# Patient Record
Sex: FEMALE | Race: WHITE | NOT HISPANIC OR LATINO | Employment: FULL TIME | URBAN - METROPOLITAN AREA
[De-identification: names, ages, dates, MRNs, and addresses within clinical notes are randomized per-mention and may not be internally consistent; named-entity substitution may affect disease eponyms.]

---

## 2023-12-28 ENCOUNTER — OFFICE VISIT (OUTPATIENT)
Dept: OBSTETRICS AND GYNECOLOGY | Facility: CLINIC | Age: 53
End: 2023-12-28
Payer: COMMERCIAL

## 2023-12-28 VITALS
BODY MASS INDEX: 29.86 KG/M2 | WEIGHT: 148.1 LBS | DIASTOLIC BLOOD PRESSURE: 79 MMHG | SYSTOLIC BLOOD PRESSURE: 123 MMHG | HEIGHT: 59 IN

## 2023-12-28 DIAGNOSIS — N95.1 VAGINAL DRYNESS, MENOPAUSAL: ICD-10-CM

## 2023-12-28 DIAGNOSIS — Z01.419 ENCOUNTER FOR ANNUAL ROUTINE GYNECOLOGICAL EXAMINATION: Primary | ICD-10-CM

## 2023-12-28 PROCEDURE — 99396 PREV VISIT EST AGE 40-64: CPT | Performed by: OBSTETRICS & GYNECOLOGY

## 2023-12-28 PROCEDURE — 1036F TOBACCO NON-USER: CPT | Performed by: OBSTETRICS & GYNECOLOGY

## 2023-12-28 RX ORDER — ESTRADIOL 10 UG/1
10 INSERT VAGINAL 2 TIMES WEEKLY
Qty: 24 EACH | Refills: 3 | Status: SHIPPED | OUTPATIENT
Start: 2023-12-28 | End: 2024-01-24

## 2023-12-28 NOTE — PROGRESS NOTES
Subjective   Patient ID: Ambika Li is a 53 y.o. female who presents for Annual Exam (Patient here for annual- no c/o/Hrt= none/Declined chaperone).  HPI  Patient is a 53-year-old  1 para 1 white female whose had 1  section.  Patient has been menopausal for approximately 3 years.  She does not use hormone therapy denies any vaginal bleeding.  Does complain of significant vaginal dryness.  She admits to regular bowel movements is up-to-date on her colonoscopy denies any urinary symptoms.  Last normal Pap smear 2021 last normal mammogram 2023.  She does follow-up with breast specialist for breast pain.  Will repeat mammogram in 2024.  Review of Systems  Abdomen: No abdominal pain, nausea, vomiting, diarrhea, or constipation. No bloating, early satiety, indigestion, or increased flatulence.  Bladder: No dysuria, gross hematuria, urinary frequency, urinary urgency, or incontinence.  Breast: No breast lumps, nipple discharge, overlying skin changes, redness or skin retraction.  Objective   Physical Exam  Gen.: Alert and in no acute distress. Well-developed, well-nourished.  Thyroid: Nonenlarged and no palpable thyroid nodules  Cardiovascular: Heart regular rate and rhythm  Pulmonary: Clear bilateral breath sounds  Breasts: Normal appearance, no nipple discharge and no skin changes. No palpable masses and no axillary lymphadenopathy  Abdomen: Soft and nontender, no abdominal mass palpated, no organomegaly   Pelvic: External genitalia normal, Bartholin's urethral and Moraga's glands normal. Vagina normal. Cervix normal. Uterus normal size and shape. Right adnexa normal without masses. Left adnexa normal without masses. Perianal area and normal.  Assessment/Plan   Annual GYN exam, Pap and HPV not needed this year, mammogram 2024, risk benefits and alternatives to Imvexxy discussed with patient and she agreed.  Will electronically sent to pharmacy on file.  Will follow-up in 1 year or  as needed.         Tato Henriquez MD 12/28/23 1:58 PM

## 2023-12-29 ENCOUNTER — APPOINTMENT (OUTPATIENT)
Dept: OBSTETRICS AND GYNECOLOGY | Facility: CLINIC | Age: 53
End: 2023-12-29
Payer: COMMERCIAL

## 2024-01-24 RX ORDER — ESTRADIOL 0.1 MG/G
1 CREAM VAGINAL 2 TIMES WEEKLY
Qty: 42.5 G | Refills: 3 | Status: SHIPPED | OUTPATIENT
Start: 2024-01-25 | End: 2025-01-24

## 2024-03-03 NOTE — PROGRESS NOTES
"History Of Present Illness :  Ambika Li is a 53 y.o. female who presents for 1 year breast health follow-up.  The patient was seen by me for initial office consultation on 3/15/2023.  Please see the note(s) in legacy  [employer] for details.    About 2 months after her visit a year ago, the patient's breast pain/discomfort completely resolved.  She currently has no symptoms or throughout the breasts such as mass, pain, nipple discharge, or skin or nipple changes.    The patient underwent bilateral digital screening mammography today with tomosynthesis.  I personally reviewed the report and the images.  The breast tissue is fibroglandular to heterogeneously dense.  There are a few benign-appearing calcifications bilaterally.  There are no suspicious densities or microcalcifications bilaterally.     3/15/2023:  This is a 52-year-old white female patient of Dr. Anne Lombardo, referred to the [facility] breast clinic by Dr. Tato Henriquez, for further evaluation of right breast pain. The patient was seen by Dr. Henriquez recently on 2/24/2023 that note was reviewed. At that time, the patient complained right breast pain laterally and superiorly near the axillary tail.     To me, the patient notes a 1 month history of a \"sensation\" in the axillary tail of the right breast. She describes this primarily as burning. This is relatively constant but does wax and wane. Nothing is associated with the exacerbation such as activity or time of day. This is random. She is taking no medications for this. She is never had similar symptoms in the past. She denies any and all other symptoms with regard to the breast such as other pain, mass, lump, nipple discharge, or skin or nipple changes. The patient does not smoke or use excessive caffeine.      All of the patient's recent breast imaging was reviewed. On 4/28/2022 the patient underwent bilateral digital screening mammogram with tomosynthesis at [facility]. I do not have the " images to review. Per report, the patient had some possible asymmetry in the right breast on the MLO view only, superiorly and posteriorly. Additional imaging was recommended. I do not have those reports. No other significant masses calcifications or other findings were noted.     More recently, the patient underwent bilateral digital diagnostic mammography with tomosynthesis on 3/1/2023 at [facility]. I personally reviewed the report and the images. She also had sonography of the right breast. With regard to mammography, the patient has scattered fibroglandular breast tissue. A marker was placed at the axillary tail of the patient's site of pain. No underlying abnormality was noted. There are no suspicious densities or calcifications bilaterally. Ultrasound of the right breast and axilla revealed 2 normal-appearing lymph nodes with no other sonographic abnormalities.     Breast cancer risk assessment includes age of 52, age of menarche of 12, and age of first live birth is 37. She is  para 1 abortus 0. The delivery was a . She has no first-degree relatives with breast cancer. She has a maternal first cousin had breast cancer. She has never had a breast biopsy or breast issue in the past.     Based on the Breast Cancer Risk Assessment Tool from the National [employer] [facility] (NCI), the patient's breast cancer risk is as follows:     Patient's 5-year risk of breast cancer development is 1.5% compared to average of 1.4% for age     Patient's lifetime risk of breast cancer development is 11.8% compared to 10.4% average for age     Patient is single. She lives in [place]. 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    Past Medical History   Past Medical History:   Diagnosis Date    Personal history of other diseases of the musculoskeletal system and connective tissue     History of spinal stenosis        Surgical History    Past Surgical History:   Procedure Laterality Date      SECTION, CLASSIC  2015     Section    OTHER SURGICAL HISTORY  2021    Carpal tunnel surgery        Allergies   No Known Allergies     Home Meds  Current Outpatient Medications   Medication Instructions    estradiol (ESTRACE) 1 g, vaginal, 2 times weekly        Family History    No family history on file.     Social History  Social History     Tobacco Use    Smoking status: Never    Smokeless tobacco: Never        Review Of Systems    Review of Systems     General: Not Present- Obesity, Cancer, HIV, MRSA, Recent Cold/Flu, Tired During the Day and VRE.  HEENT: Not Present- Migraine, Cataracts, Glaucoma, Macular Degeneration and Retinal Detachment.  Respiratory: Not Present- Asthma, Chronic Cough, Difficulty Breathing on Exertion, Difficulty Breathing at Rest, Emphysema, Frequent Bronchitis, Home CPAP/BiPAP, Home Oxygen, Pulmonary Embolus, Pneumonia/TB, Sleep Apnea and Snoring.  Cardiovascular: Not Present- Chest Pain, Congestive Heart Failure, Heart Attack, Coronary Artery Disease, Heart Stent, High Cholesterol/Lipids, Hypertension, Internal Defibrillator, Irregular Heart Beat, Mitral Valve Prolapse, Murmur, Pacemaker and Peripheral Vascular Disease.  Gastrointestinal: Not Present- Heartburn, Hepatitis, Hiatal Hernia, Jaundice, Reflux, Stomach Ulcer and IBS.  Female Genitourinary: Not Present- Kidney Failure, Kidney Stones, Dialysis and Urinary Tract Infection.  Musculoskeletal: Not Present- Arthritis, Back Pain and Fibromyalgia.  Neurological: Not Present- Headaches, Numbness, Tingling, Seizures, Stroke,  Shunt and Weakness.  Psychiatric: Not Present- Anxiety, Bipolar, Depression and Panic Attacks.  Endocrine: Not Present- Diabetes, Hyperthyroidism, Hypothyroidism and Low Blood Sugar.  Hematology: Not Present- Abnormal Bleeding, Anemia and Blood Clots.     Vitals  There were no vitals taken for this visit.     Physical Exam   Breast Physical Exam     General  Mental Status - Alert. General  Appearance - Not Anxious, in acute distress or Sickly. Orientation - Oriented X3. Build  & Nutrition - Well nourished. Posture - Normal posture. Gait - Normal. Hydration - Well hydrated. Voice - Normal.     Integumentary  Global Assessment: Upon inspection and palpation of skin surfaces of the - Head/Face: no rashes, ulcers, lesions or evidence of photo damage. No palpable nodules or masses, Neck: no visible lesions or palpable masses, Chest: no  swelling,scarring or lesions. No prominent arteries or veins observed and Abdomen: no scars, rashes or lesions.     Breast  Nipples: Characteristics - Bilateral - Normal. Discharge - Bilateral - None.  Breast - Bilateral - Non Tender. No Bulging, Dimpling, Inflammation or Scarring. Large. Slightly pendulous. Symmetric.   Breast Lump: - No Palpable Breast Mass.     Lymphatic  Head & Neck  General Head & Neck Lymphatics:  Bilateral: Description - No Localized lymphadenopathy. Overlying skin - Normal.  Axillary  General Axillary Region:  Bilateral: Description - No Localized lymphadenopathy. Tenderness - Non Tender.     Assessment/Plan     Mrs. Li's clinical and mammographic follow-up remains satisfactory.    The right mastalgia has resolved.         Recommendation:     Continue monthly self breast exams    The patient will return to Dr. Henriquez for yearly breast exams and screening mammography    Follow-up with me as needed

## 2024-03-06 ENCOUNTER — HOSPITAL ENCOUNTER (OUTPATIENT)
Dept: RADIOLOGY | Facility: CLINIC | Age: 54
Discharge: HOME | End: 2024-03-06
Payer: COMMERCIAL

## 2024-03-06 ENCOUNTER — OFFICE VISIT (OUTPATIENT)
Dept: SURGERY | Facility: CLINIC | Age: 54
End: 2024-03-06
Payer: COMMERCIAL

## 2024-03-06 VITALS
TEMPERATURE: 97.8 F | HEIGHT: 59 IN | DIASTOLIC BLOOD PRESSURE: 81 MMHG | WEIGHT: 145 LBS | RESPIRATION RATE: 18 BRPM | SYSTOLIC BLOOD PRESSURE: 123 MMHG | BODY MASS INDEX: 29.23 KG/M2 | HEART RATE: 65 BPM | OXYGEN SATURATION: 97 %

## 2024-03-06 DIAGNOSIS — N64.4 MASTODYNIA: ICD-10-CM

## 2024-03-06 DIAGNOSIS — Z12.31 ENCOUNTER FOR SCREENING MAMMOGRAM FOR MALIGNANT NEOPLASM OF BREAST: Primary | ICD-10-CM

## 2024-03-06 PROCEDURE — 77067 SCR MAMMO BI INCL CAD: CPT | Mod: BILATERAL PROCEDURE | Performed by: RADIOLOGY

## 2024-03-06 PROCEDURE — 77063 BREAST TOMOSYNTHESIS BI: CPT | Mod: BILATERAL PROCEDURE | Performed by: RADIOLOGY

## 2024-03-06 PROCEDURE — 77063 BREAST TOMOSYNTHESIS BI: CPT

## 2024-03-06 PROCEDURE — 99213 OFFICE O/P EST LOW 20 MIN: CPT | Performed by: SURGERY

## 2024-03-06 PROCEDURE — 1036F TOBACCO NON-USER: CPT | Performed by: SURGERY

## 2024-03-06 SDOH — ECONOMIC STABILITY: FOOD INSECURITY: WITHIN THE PAST 12 MONTHS, THE FOOD YOU BOUGHT JUST DIDN'T LAST AND YOU DIDN'T HAVE MONEY TO GET MORE.: NEVER TRUE

## 2024-03-06 SDOH — ECONOMIC STABILITY: FOOD INSECURITY: WITHIN THE PAST 12 MONTHS, YOU WORRIED THAT YOUR FOOD WOULD RUN OUT BEFORE YOU GOT MONEY TO BUY MORE.: NEVER TRUE

## 2024-03-06 ASSESSMENT — COLUMBIA-SUICIDE SEVERITY RATING SCALE - C-SSRS
2. HAVE YOU ACTUALLY HAD ANY THOUGHTS OF KILLING YOURSELF?: NO
1. IN THE PAST MONTH, HAVE YOU WISHED YOU WERE DEAD OR WISHED YOU COULD GO TO SLEEP AND NOT WAKE UP?: NO
6. HAVE YOU EVER DONE ANYTHING, STARTED TO DO ANYTHING, OR PREPARED TO DO ANYTHING TO END YOUR LIFE?: NO

## 2024-03-06 ASSESSMENT — LIFESTYLE VARIABLES
SKIP TO QUESTIONS 9-10: 1
HOW MANY STANDARD DRINKS CONTAINING ALCOHOL DO YOU HAVE ON A TYPICAL DAY: 1 OR 2
HOW OFTEN DO YOU HAVE A DRINK CONTAINING ALCOHOL: 2-4 TIMES A MONTH
AUDIT-C TOTAL SCORE: 2
HOW OFTEN DO YOU HAVE SIX OR MORE DRINKS ON ONE OCCASION: NEVER

## 2024-03-06 ASSESSMENT — ENCOUNTER SYMPTOMS
OCCASIONAL FEELINGS OF UNSTEADINESS: 0
DEPRESSION: 0
LOSS OF SENSATION IN FEET: 0

## 2024-03-06 ASSESSMENT — PATIENT HEALTH QUESTIONNAIRE - PHQ9
SUM OF ALL RESPONSES TO PHQ9 QUESTIONS 1 & 2: 0
1. LITTLE INTEREST OR PLEASURE IN DOING THINGS: NOT AT ALL
2. FEELING DOWN, DEPRESSED OR HOPELESS: NOT AT ALL

## 2024-03-06 ASSESSMENT — PAIN SCALES - GENERAL: PAINLEVEL: 0-NO PAIN

## 2024-05-23 ENCOUNTER — TELEPHONE (OUTPATIENT)
Dept: OBSTETRICS AND GYNECOLOGY | Facility: CLINIC | Age: 54
End: 2024-05-23
Payer: COMMERCIAL

## 2024-05-23 DIAGNOSIS — N95.0 PMB (POSTMENOPAUSAL BLEEDING): Primary | ICD-10-CM

## 2024-05-23 NOTE — TELEPHONE ENCOUNTER
Poke to patient on the phone.  Had weeklong episode of period like bleeding.  Has not bled in about 3 years.  Currently does not use any hormone therapy.  Recommend pelvic ultrasound and proceed accordingly.    ----- Message from Florence Sung sent at 5/22/2024 12:39 PM EDT -----  Regarding: FW: Period  Contact: [phone]  Hi Dr. Henriquez ,  Please advise.  Thank you   GG  ----- Message -----  From: Ambika Li  Sent: 5/22/2024   9:58 AM EDT  [facility]  [id number]  Subject: Period                                           Hi Dr. Henriquez  I was thinking I was post-menopausal after almost 3 years of not mensurating. Surprise, surprise, I bled like a regular period for 8 days. Should I be concerned? It seemed normal as I remember my periods would have been. Do I restart the clock? Should I come in to be checked as a precaution? Would this recent period justify have a pap to verify no issue? Let me know your opinion. Ambika [phone]

## 2024-05-29 ENCOUNTER — HOSPITAL ENCOUNTER (OUTPATIENT)
Dept: RADIOLOGY | Facility: CLINIC | Age: 54
Discharge: HOME | End: 2024-05-29
Payer: COMMERCIAL

## 2024-05-29 DIAGNOSIS — N95.0 PMB (POSTMENOPAUSAL BLEEDING): ICD-10-CM

## 2024-05-29 PROCEDURE — 76830 TRANSVAGINAL US NON-OB: CPT | Performed by: RADIOLOGY

## 2024-05-29 PROCEDURE — 76857 US EXAM PELVIC LIMITED: CPT | Performed by: RADIOLOGY

## 2024-05-29 PROCEDURE — 76856 US EXAM PELVIC COMPLETE: CPT

## 2024-08-02 ENCOUNTER — APPOINTMENT (OUTPATIENT)
Dept: OBSTETRICS AND GYNECOLOGY | Facility: CLINIC | Age: 54
End: 2024-08-02
Payer: COMMERCIAL

## 2024-08-02 VITALS
WEIGHT: 144.7 LBS | HEIGHT: 59 IN | SYSTOLIC BLOOD PRESSURE: 130 MMHG | BODY MASS INDEX: 29.17 KG/M2 | DIASTOLIC BLOOD PRESSURE: 84 MMHG

## 2024-08-02 DIAGNOSIS — N95.0 PMB (POSTMENOPAUSAL BLEEDING): Primary | ICD-10-CM

## 2024-08-02 PROCEDURE — 58100 BIOPSY OF UTERUS LINING: CPT | Performed by: OBSTETRICS & GYNECOLOGY

## 2024-08-02 NOTE — PROGRESS NOTES
Patient ID: Ambika Li is a 54 y.o. female.    Endometrial biopsy    Date/Time: 8/2/2024 9:38 AM    Performed by: Tato Henriquez MD  Authorized by: Tato Henriquez MD    Consent:     Consent obtained: written    Consent given by: patient    Risks discussed: bleeding, infection and pain    Alternatives discussed: alternative treatment    Patient agrees, verbalizes understanding, and wants to proceed: yes      Procedure explained and questions answered to patient or proxy's satisfaction: yes    Indications:     Indications: postmenopausal bleeding    Pre-procedure:     Urine pregnancy test: N/A    Procedure:     Prepped with: Betadine    Tenaculum used: yes      Cervix dilated: yes      Number of passes: 1  Findings:     Cervix: normal      Specimen collected: specimen collected and sent to pathology      Patient tolerance: tolerated well, no immediate complications

## 2024-08-07 LAB
LABORATORY COMMENT REPORT: NORMAL
PATH REPORT.FINAL DX SPEC: NORMAL
PATH REPORT.GROSS SPEC: NORMAL
PATH REPORT.RELEVANT HX SPEC: NORMAL
PATH REPORT.TOTAL CANCER: NORMAL

## 2025-04-14 ENCOUNTER — HOSPITAL ENCOUNTER (OUTPATIENT)
Dept: RADIOLOGY | Facility: CLINIC | Age: 55
Discharge: HOME | End: 2025-04-14
Payer: COMMERCIAL

## 2025-04-14 DIAGNOSIS — Z12.31 ENCOUNTER FOR SCREENING MAMMOGRAM FOR MALIGNANT NEOPLASM OF BREAST: ICD-10-CM

## 2025-04-14 PROCEDURE — 77067 SCR MAMMO BI INCL CAD: CPT | Performed by: STUDENT IN AN ORGANIZED HEALTH CARE EDUCATION/TRAINING PROGRAM

## 2025-04-14 PROCEDURE — 77067 SCR MAMMO BI INCL CAD: CPT

## 2025-04-14 PROCEDURE — 77063 BREAST TOMOSYNTHESIS BI: CPT | Performed by: STUDENT IN AN ORGANIZED HEALTH CARE EDUCATION/TRAINING PROGRAM
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
## 2025-05-25 DIAGNOSIS — S00.06XA TICK BITE OF SCALP, INITIAL ENCOUNTER: Primary | ICD-10-CM

## 2025-05-25 RX ORDER — DOXYCYCLINE 100 MG/1
100 CAPSULE ORAL DAILY
Qty: 1 CAPSULE | Refills: 0 | Status: SHIPPED | OUTPATIENT
Start: 2025-05-25 | End: 2025-05-26